# Patient Record
Sex: FEMALE | Race: BLACK OR AFRICAN AMERICAN | NOT HISPANIC OR LATINO | Employment: FULL TIME | URBAN - METROPOLITAN AREA
[De-identification: names, ages, dates, MRNs, and addresses within clinical notes are randomized per-mention and may not be internally consistent; named-entity substitution may affect disease eponyms.]

---

## 2024-05-17 ENCOUNTER — OFFICE VISIT (OUTPATIENT)
Dept: URGENT CARE | Facility: CLINIC | Age: 29
End: 2024-05-17
Payer: OTHER GOVERNMENT

## 2024-05-17 VITALS
OXYGEN SATURATION: 97 % | SYSTOLIC BLOOD PRESSURE: 145 MMHG | HEIGHT: 61 IN | DIASTOLIC BLOOD PRESSURE: 90 MMHG | BODY MASS INDEX: 29.45 KG/M2 | RESPIRATION RATE: 14 BRPM | HEART RATE: 71 BPM | WEIGHT: 156 LBS

## 2024-05-17 DIAGNOSIS — S39.012A LUMBAR STRAIN, INITIAL ENCOUNTER: ICD-10-CM

## 2024-05-17 DIAGNOSIS — S16.1XXA CERVICAL STRAIN, ACUTE, INITIAL ENCOUNTER: ICD-10-CM

## 2024-05-17 DIAGNOSIS — S13.4XXA WHIPLASH INJURIES, INITIAL ENCOUNTER: Primary | ICD-10-CM

## 2024-05-17 DIAGNOSIS — Z02.6 ENCOUNTER RELATED TO WORKER'S COMPENSATION CLAIM: ICD-10-CM

## 2024-05-17 DIAGNOSIS — G44.319 ACUTE POST-TRAUMATIC HEADACHE, NOT INTRACTABLE: ICD-10-CM

## 2024-05-17 DIAGNOSIS — S29.019A THORACIC MYOFASCIAL STRAIN, INITIAL ENCOUNTER: ICD-10-CM
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
## 2024-05-17 PROCEDURE — 99204 OFFICE O/P NEW MOD 45 MIN: CPT | Mod: S$GLB,,, | Performed by: STUDENT IN AN ORGANIZED HEALTH CARE EDUCATION/TRAINING PROGRAM

## 2024-05-17 RX ORDER — METHOCARBAMOL 500 MG/1
500 TABLET, FILM COATED ORAL 3 TIMES DAILY PRN
Qty: 30 TABLET | Refills: 0 | Status: SHIPPED | OUTPATIENT
Start: 2024-05-17

## 2024-05-17 RX ORDER — NAPROXEN 500 MG/1
500 TABLET ORAL 2 TIMES DAILY WITH MEALS
Qty: 30 TABLET | Refills: 0 | Status: SHIPPED | OUTPATIENT
Start: 2024-05-17
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
## 2024-05-20 ENCOUNTER — OFFICE VISIT (OUTPATIENT)
Dept: URGENT CARE | Facility: CLINIC | Age: 29
End: 2024-05-20
Payer: OTHER GOVERNMENT

## 2024-05-20 VITALS
TEMPERATURE: 99 F | RESPIRATION RATE: 18 BRPM | SYSTOLIC BLOOD PRESSURE: 127 MMHG | OXYGEN SATURATION: 98 % | BODY MASS INDEX: 29.45 KG/M2 | HEART RATE: 79 BPM | HEIGHT: 61 IN | WEIGHT: 156 LBS | DIASTOLIC BLOOD PRESSURE: 77 MMHG

## 2024-05-20 DIAGNOSIS — G44.319 ACUTE POST-TRAUMATIC HEADACHE, NOT INTRACTABLE: ICD-10-CM

## 2024-05-20 DIAGNOSIS — S13.4XXD WHIPLASH, SUBSEQUENT ENCOUNTER: ICD-10-CM

## 2024-05-20 DIAGNOSIS — S16.1XXD CERVICAL STRAIN, SUBSEQUENT ENCOUNTER: ICD-10-CM

## 2024-05-20 DIAGNOSIS — S39.012D LUMBAR STRAIN, SUBSEQUENT ENCOUNTER: Primary | ICD-10-CM

## 2024-05-20 DIAGNOSIS — S29.019D THORACIC MYOFASCIAL STRAIN, SUBSEQUENT ENCOUNTER: ICD-10-CM

## 2024-05-20 DIAGNOSIS — Z02.6 ENCOUNTER RELATED TO WORKER'S COMPENSATION CLAIM: ICD-10-CM

## 2024-05-20 PROCEDURE — 99213 OFFICE O/P EST LOW 20 MIN: CPT | Mod: S$GLB,,, | Performed by: STUDENT IN AN ORGANIZED HEALTH CARE EDUCATION/TRAINING PROGRAM

## 2024-05-20 NOTE — LETTER
[facility]  [address] [address]  Phone: [phone]  Fax: [phone]  [facility]: [id number]    Pt Name: Mckenna Haskins Date: 05/17/2024   Employee ID: [id number] Date of Treatment: 05/20/2024   Company: [employer]      Appointment Time: 02:45 PM Arrived: 2:37 PM    Provider: Michlele Reynolds MD Time Out: 3:19 PM     Office Treatment:   1. Lumbar strain, subsequent encounter    2. Encounter related to worker's compensation claim    3. Cervical strain, subsequent encounter    4. Thoracic myofascial strain, subsequent encounter    5. Acute post-traumatic headache, not intractable    6. Whiplash, subsequent encounter          Patient Instructions: Attention not to aggravate affected area      Restrictions:  (See CA-17. Reduce shift duration and amount of weight lifted)     Return Appointment: 5/29/2024 at 2:45 PM
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
## 2024-05-30 ENCOUNTER — OFFICE VISIT (OUTPATIENT)
Dept: URGENT CARE | Facility: CLINIC | Age: 29
End: 2024-05-30
Payer: OTHER GOVERNMENT

## 2024-05-30 VITALS
WEIGHT: 156 LBS | HEIGHT: 61 IN | TEMPERATURE: 98 F | SYSTOLIC BLOOD PRESSURE: 119 MMHG | RESPIRATION RATE: 14 BRPM | BODY MASS INDEX: 29.45 KG/M2 | OXYGEN SATURATION: 99 % | DIASTOLIC BLOOD PRESSURE: 78 MMHG | HEART RATE: 64 BPM

## 2024-05-30 DIAGNOSIS — S39.012D LUMBAR STRAIN, SUBSEQUENT ENCOUNTER: Primary | ICD-10-CM

## 2024-05-30 DIAGNOSIS — S16.1XXD CERVICAL STRAIN, SUBSEQUENT ENCOUNTER: ICD-10-CM

## 2024-05-30 DIAGNOSIS — G44.319 ACUTE POST-TRAUMATIC HEADACHE, NOT INTRACTABLE: ICD-10-CM

## 2024-05-30 DIAGNOSIS — S13.4XXD WHIPLASH, SUBSEQUENT ENCOUNTER: ICD-10-CM

## 2024-05-30 DIAGNOSIS — Z02.6 ENCOUNTER RELATED TO WORKER'S COMPENSATION CLAIM: ICD-10-CM

## 2024-05-30 DIAGNOSIS — S29.019D THORACIC MYOFASCIAL STRAIN, SUBSEQUENT ENCOUNTER: ICD-10-CM

## 2024-05-30 PROCEDURE — 99214 OFFICE O/P EST MOD 30 MIN: CPT | Mod: S$GLB,,, | Performed by: STUDENT IN AN ORGANIZED HEALTH CARE EDUCATION/TRAINING PROGRAM

## 2024-05-30 NOTE — LETTER
[facility]  [address] [address]  Phone: [phone]  Fax: [phone]  [facility]: [id number]    Pt Name: Mckenna Haskins Date: 05/17/2024   Employee ID: [id number] Date of Treatment: 05/30/2024   Company: [employer]      Appointment Time: 11:45 AM Arrived: 12:03 PM   Provider: Michelle Reynolds MD Time Out:12:55 PM     Office Treatment:   1. Lumbar strain, subsequent encounter    2. Encounter related to worker's compensation claim    3. Cervical strain, subsequent encounter    4. Thoracic myofascial strain, subsequent encounter    5. Whiplash, subsequent encounter    6. Acute post-traumatic headache, not intractable          Patient Instructions: Attention not to aggravate affected area, Daily home exercises/warm soaks    Restrictions: Regular Duty     Return Appointment: if symptoms worsen or fail to improve

## 2024-05-30 NOTE — LETTER
[facility]  [address] [address]  Phone: [phone]  Fax: [phone]  [facility]: [id number]    Pt Name: Mckenna Haskins Date: 05/17/2024   Employee ID: [id number] Date of First Treatment: 05/30/2024   Company: [employer]      Appointment Time: 11:45 AM Arrived: ***   Provider: Michelle Reynolds MD Time Out:***     Office Treatment:   1. Lumbar strain, subsequent encounter    2. Encounter related to worker's compensation claim    3. Cervical strain, subsequent encounter    4. Thoracic myofascial strain, subsequent encounter    5. Whiplash, subsequent encounter    6. Acute post-traumatic headache, not intractable          Patient Instructions: Attention not to aggravate affected area, Daily home exercises/warm soaks    Restrictions: Regular Duty     Return Appointment: 6/6/2024 at ***
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
## 2024-05-30 NOTE — LETTER
[facility]  [address] [address]  Phone: [phone]  Fax: [phone]  [facility]: [id number]    Pt Name: Mckenna Haskins Date: 05/17/2024   Employee ID: [id number] Date of Treatment: 05/30/2024   Company: [employer]      Appointment Time: 11:45 AM Arrived: 12:03 PM   Provider: Michelle Reynolds MD Time Out:12:55 PM     Office Treatment:   1. Lumbar strain, subsequent encounter    2. Encounter related to worker's compensation claim    3. Cervical strain, subsequent encounter    4. Thoracic myofascial strain, subsequent encounter    5. Whiplash, subsequent encounter    6. Acute post-traumatic headache, not intractable          Patient Instructions: Attention not to aggravate affected area, Daily home exercises/warm soaks        Restrictions: Regular Duty     Return Appointment: 6/6/2024 at 6/6/2024    KW

## 2024-06-07 ENCOUNTER — TELEPHONE (OUTPATIENT)
Dept: URGENT CARE | Facility: CLINIC | Age: 29
End: 2024-06-07
Payer: OTHER GOVERNMENT

## 2024-06-07 NOTE — TELEPHONE ENCOUNTER
Called the patient and the patient verbalized  the new [facility]  Appt, date & time. [employer]